# Patient Record
Sex: MALE | Race: WHITE | NOT HISPANIC OR LATINO | Employment: FULL TIME | URBAN - METROPOLITAN AREA
[De-identification: names, ages, dates, MRNs, and addresses within clinical notes are randomized per-mention and may not be internally consistent; named-entity substitution may affect disease eponyms.]

---

## 2022-01-12 ENCOUNTER — OFFICE VISIT (OUTPATIENT)
Dept: GASTROENTEROLOGY | Facility: CLINIC | Age: 57
End: 2022-01-12

## 2022-01-12 VITALS
SYSTOLIC BLOOD PRESSURE: 129 MMHG | WEIGHT: 173 LBS | OXYGEN SATURATION: 97 % | BODY MASS INDEX: 22.93 KG/M2 | DIASTOLIC BLOOD PRESSURE: 82 MMHG | HEIGHT: 73 IN | HEART RATE: 64 BPM | RESPIRATION RATE: 14 BRPM

## 2022-01-12 DIAGNOSIS — Z01.812 PRE-PROCEDURE LAB EXAM: Primary | ICD-10-CM

## 2022-01-12 DIAGNOSIS — K62.5 RECTAL BLEEDING: Primary | ICD-10-CM

## 2022-01-12 DIAGNOSIS — R10.9 ABDOMINAL PAIN, UNSPECIFIED ABDOMINAL LOCATION: ICD-10-CM

## 2022-01-12 PROCEDURE — 99999 PR PBB SHADOW E&M-NEW PATIENT-LVL IV: ICD-10-PCS | Mod: PBBFAC,,, | Performed by: INTERNAL MEDICINE

## 2022-01-12 PROCEDURE — 99204 OFFICE O/P NEW MOD 45 MIN: CPT | Mod: S$PBB,,, | Performed by: INTERNAL MEDICINE

## 2022-01-12 PROCEDURE — 99204 PR OFFICE/OUTPT VISIT, NEW, LEVL IV, 45-59 MIN: ICD-10-PCS | Mod: S$PBB,,, | Performed by: INTERNAL MEDICINE

## 2022-01-12 PROCEDURE — 99204 OFFICE O/P NEW MOD 45 MIN: CPT | Mod: PBBFAC,PN | Performed by: INTERNAL MEDICINE

## 2022-01-12 PROCEDURE — 99999 PR PBB SHADOW E&M-NEW PATIENT-LVL IV: CPT | Mod: PBBFAC,,, | Performed by: INTERNAL MEDICINE

## 2022-01-12 RX ORDER — LISINOPRIL 10 MG/1
10 TABLET ORAL DAILY
COMMUNITY

## 2022-01-12 NOTE — PATIENT INSTRUCTIONS
He will be scheduled for CT scan of the abdomen to evaluate the back pain and abdominal symptoms.  He will need to have a colonoscopy.  Labs will be obtained today.  He continues his current medications vitamins and minerals and adequate fiber to produced daily bowel movements.

## 2022-01-12 NOTE — PROGRESS NOTES
Subjective:       Patient ID: Lucas Dorsey is a 56 y.o. male.    Chief Complaint: Rectal Bleeding (PT reports having large amounts of blood in stool.Accompanying back pain) and Abdominal Pain (X 4 months )    He states approximately 3 months ago he developed lower back pain.  He did not associate the back pain with work injury or musculoskeletal spasm.  He thought he might have had a kidney stone although he denies  symptoms.  Several years ago he was told he had prostatism is been on the Flomax but he has not experienced dysuria or hematuria.  At approximately the same time he started having episodes of hematochezia.  The bowel movements were always associated with red blood.  Sometimes he passed red blood clots..  The episodes lasted for a few days and then they stopped.  He cannot pinpoint a precipitating factor.  The rectal bleeding did not appear to be associated with the back pain.  He works as a  and is physically active.  He has been physically active at all of his life.  He denies easy bleeding or hematemesis.  He states his bowel function today is normal.  He was adopted so he does not know his family history.      Allergies:  Review of patient's allergies indicates:   Allergen Reactions    Erythromycin Shortness Of Breath    Penicillins Shortness Of Breath       Medications:    Current Outpatient Medications:     lisinopriL 10 MG tablet, Take 10 mg by mouth once daily., Disp: , Rfl:     tamsulosin HCl (FLOMAX ORAL), Take by mouth. Unknown dosage, Disp: , Rfl:     History reviewed. No pertinent past medical history.    History reviewed. No pertinent surgical history.      Review of Systems   Constitutional: Negative for appetite change, fever and unexpected weight change.   HENT: Negative for trouble swallowing.         No jaundice.   Respiratory: Negative for cough, shortness of breath and wheezing.         He is a cigarette smoker.  He has been offered the smoking cessation  program in the past.  He denies alcohol usage.  He denies dysphagia aspiration hemoptysis chronic cough chronic sputum production or dyspnea on exertion.   Cardiovascular: Negative for chest pain.        He denies exertional chest pain or rhythm disturbance   Gastrointestinal: Positive for anal bleeding. Negative for abdominal distention, abdominal pain, blood in stool, constipation, diarrhea, nausea and rectal pain.        He was treated 2 years ago for chronic hepatitis C as was his wife.. They were immunized for hepatitis a and B. They were told they were SVR   Musculoskeletal: Positive for arthralgias and back pain. Negative for neck pain.        He has had surgery on left ankle   Skin: Negative for pallor and rash.   Neurological: Negative for dizziness, seizures, syncope, speech difficulty, weakness and numbness.   Hematological: Negative for adenopathy.   Psychiatric/Behavioral: Negative for confusion.       Objective:      Physical Exam  Vitals reviewed.   Constitutional:       Appearance: He is well-developed and well-nourished.      Comments: Well-nourished well-hydrated afebrile nonicteric white male.  He is normocephalic.  Pupils are normal.  He appears to be oriented x3 and can relate his history and answer questions appropriately.   HENT:      Head: Normocephalic.   Eyes:      Extraocular Movements: EOM normal.      Pupils: Pupils are equal, round, and reactive to light.   Neck:      Thyroid: No thyromegaly.      Trachea: No tracheal deviation.   Cardiovascular:      Rate and Rhythm: Normal rate and regular rhythm.      Heart sounds: Normal heart sounds.   Pulmonary:      Effort: Pulmonary effort is normal.      Breath sounds: Normal breath sounds.   Abdominal:      General: Bowel sounds are normal. There is no distension.      Palpations: Abdomen is soft. There is no mass.      Tenderness: There is no abdominal tenderness. There is no right CVA tenderness, left CVA tenderness, guarding or rebound.       Hernia: No hernia is present.      Comments: The abdomen is soft without tenderness masses organomegaly.  Bowel sounds are normal.   Musculoskeletal:         General: Normal range of motion.      Cervical back: Normal range of motion and neck supple.      Comments: Ambulate normally.  He can go from the sitting the standing position without difficulty.  He can get on the exam table without difficulty or assistance.   Lymphadenopathy:      Cervical: No cervical adenopathy.   Skin:     General: Skin is warm and dry.   Neurological:      Mental Status: He is alert and oriented to person, place, and time.      Cranial Nerves: No cranial nerve deficit.   Psychiatric:         Mood and Affect: Mood and affect normal.         Behavior: Behavior normal.           Plan:       Rectal bleeding    Abdominal pain, unspecified abdominal location  -     CT Abdomen Pelvis W Wo Contrast; Future; Expected date: 01/12/2022  -     CBC Auto Differential; Future; Expected date: 01/12/2022  -     Comprehensive Metabolic Panel; Future; Expected date: 01/12/2022    Initially CT scan and labs will be obtained.  The prior clinic records were requested.  He will need to have a colonoscopy.  He will make a food diary avoid the offending foods and add more fiber to the diet.  He continues his current medications vitamins and minerals.  He is encouraged to enroll in the smoking cessation program.

## 2022-01-18 ENCOUNTER — HOSPITAL ENCOUNTER (OUTPATIENT)
Dept: RADIOLOGY | Facility: HOSPITAL | Age: 57
Discharge: HOME | End: 2022-01-18
Attending: INTERNAL MEDICINE

## 2022-01-18 DIAGNOSIS — R10.9 ABDOMINAL PAIN, UNSPECIFIED ABDOMINAL LOCATION: ICD-10-CM

## 2022-01-18 PROCEDURE — 74178 CT ABD&PLV WO CNTR FLWD CNTR: CPT | Mod: TC

## 2022-01-18 PROCEDURE — 25500020 PHARM REV CODE 255: Performed by: INTERNAL MEDICINE

## 2022-01-18 PROCEDURE — A9698 NON-RAD CONTRAST MATERIALNOC: HCPCS | Performed by: INTERNAL MEDICINE

## 2022-01-18 PROCEDURE — 74178 CT ABD&PLV WO CNTR FLWD CNTR: CPT | Mod: 26,,, | Performed by: RADIOLOGY

## 2022-01-18 PROCEDURE — 74178 CT ABDOMEN PELVIS W WO CONTRAST: ICD-10-PCS | Mod: 26,,, | Performed by: RADIOLOGY

## 2022-01-18 RX ADMIN — IOHEXOL 1000 ML: 9 SOLUTION ORAL at 11:01

## 2022-01-18 RX ADMIN — IOHEXOL 75 ML: 350 INJECTION, SOLUTION INTRAVENOUS at 12:01

## 2022-01-19 ENCOUNTER — TELEPHONE (OUTPATIENT)
Dept: GASTROENTEROLOGY | Facility: CLINIC | Age: 57
End: 2022-01-19

## 2022-01-19 NOTE — TELEPHONE ENCOUNTER
----- Message from Dino Yang sent at 1/19/2022  3:15 PM CST -----  Contact: pt self [phone]  Type: Patient Call Back         Who called: Pt self          What is the request in detail:In regards to CT results          Can the clinic reply by [facility]?No         Would the patient rather a call back or a response via [facility]? Call back          Best call back number:[phone]         Additional Information:            Thank You

## 2022-01-19 NOTE — TELEPHONE ENCOUNTER
Spoke to patient regarding result. Informed him that it has not been reviewed by the doctor yet and that I will give him a call with the results as soon as I get them. Pt verbalized understanding and stated to contact him at listed number.

## 2022-01-20 ENCOUNTER — TELEPHONE (OUTPATIENT)
Dept: GASTROENTEROLOGY | Facility: CLINIC | Age: 57
End: 2022-01-20

## 2022-01-20 DIAGNOSIS — K62.5 RECTAL BLEEDING: Primary | ICD-10-CM

## 2022-01-20 DIAGNOSIS — R10.9 ABDOMINAL PAIN, UNSPECIFIED ABDOMINAL LOCATION: ICD-10-CM

## 2022-01-20 DIAGNOSIS — Z01.818 PRE-OP TESTING: ICD-10-CM

## 2022-01-20 DIAGNOSIS — N40.0 ENLARGED PROSTATE: Primary | ICD-10-CM

## 2022-01-20 NOTE — TELEPHONE ENCOUNTER
Spoke with patient with results. He stated he would like to proceed with Flushing for the bleeding and get a referral to urology for the prostate. Pt coming in 1/24/22 @ 8:30 to review prep instructions and schedule for upcoming tests. Pt verbalized understanding.

## 2022-01-24 ENCOUNTER — CLINICAL SUPPORT (OUTPATIENT)
Dept: GASTROENTEROLOGY | Facility: CLINIC | Age: 57
End: 2022-01-24

## 2022-01-24 ENCOUNTER — TELEPHONE (OUTPATIENT)
Dept: GASTROENTEROLOGY | Facility: CLINIC | Age: 57
End: 2022-01-24

## 2022-01-24 DIAGNOSIS — K62.5 RECTAL BLEEDING: ICD-10-CM

## 2022-01-24 DIAGNOSIS — Z01.818 PREOP TESTING: Primary | ICD-10-CM

## 2022-01-24 RX ORDER — SODIUM, POTASSIUM,MAG SULFATES 17.5-3.13G
1 SOLUTION, RECONSTITUTED, ORAL ORAL DAILY
Qty: 1 KIT | Refills: 0 | Status: SHIPPED | OUTPATIENT
Start: 2022-01-24 | End: 2022-01-26

## 2022-01-24 NOTE — TELEPHONE ENCOUNTER
----- Message from Maria Fernanda Thayer sent at 1/24/2022 12:06 PM CST -----  Type:  Pharmacy Calling to Clarify an RX    Name of Caller:  [facility]  Pharmacy Name:  [facility]   Prescription Name:  Prep kit for procedure  What do they need to clarify?:  Asking if there is a different type that the patient can take. The medication is 100 dollars out of pocket  Best Call Back Number:  [phone]  Additional Information:  Please call and advise

## 2022-01-24 NOTE — PROGRESS NOTES
Spoke with patient and informed patient on preop intsructions.  Patient verbalized understanding and has been scheduled for preop testing.  No further questions or concerns at this time.

## 2022-01-26 ENCOUNTER — TELEPHONE (OUTPATIENT)
Dept: GASTROENTEROLOGY | Facility: CLINIC | Age: 57
End: 2022-01-26

## 2022-01-26 NOTE — TELEPHONE ENCOUNTER
----- Message from Mariah Singh sent at 1/25/2022  2:43 PM CST -----  Contact: Patient    ----- Message -----  From: Clarisse Cortez  Sent: 1/24/2022   4:30 PM CST  To: Vladislav Keller Staff    Type:  Patient Returning Call    Who Called:  Patient  Who Left Message for Patient:  Ashlyn  Does the patient know what this is regarding?:  Yes  Best Call Back Number:  [phone]  Additional Information:  Please call back.  Thanks.

## 2022-01-30 ENCOUNTER — LAB VISIT (OUTPATIENT)
Dept: FAMILY MEDICINE | Facility: CLINIC | Age: 57
End: 2022-01-30
Payer: OTHER GOVERNMENT

## 2022-01-30 ENCOUNTER — HOSPITAL ENCOUNTER (OUTPATIENT)
Dept: CARDIOLOGY | Facility: HOSPITAL | Age: 57
Discharge: HOME | End: 2022-01-30
Attending: INTERNAL MEDICINE
Payer: OTHER GOVERNMENT

## 2022-01-30 DIAGNOSIS — Z01.818 PREOP TESTING: ICD-10-CM

## 2022-01-30 DIAGNOSIS — Z01.818 PRE-OP TESTING: ICD-10-CM

## 2022-01-30 PROCEDURE — U0005 INFEC AGEN DETEC AMPLI PROBE: HCPCS | Performed by: INTERNAL MEDICINE

## 2022-01-30 PROCEDURE — 93010 ELECTROCARDIOGRAM REPORT: CPT | Mod: ,,, | Performed by: INTERNAL MEDICINE

## 2022-01-30 PROCEDURE — 93005 ELECTROCARDIOGRAM TRACING: CPT

## 2022-01-30 PROCEDURE — 93010 EKG 12-LEAD: ICD-10-PCS | Mod: ,,, | Performed by: INTERNAL MEDICINE

## 2022-01-30 PROCEDURE — U0003 INFECTIOUS AGENT DETECTION BY NUCLEIC ACID (DNA OR RNA); SEVERE ACUTE RESPIRATORY SYNDROME CORONAVIRUS 2 (SARS-COV-2) (CORONAVIRUS DISEASE [COVID-19]), AMPLIFIED PROBE TECHNIQUE, MAKING USE OF HIGH THROUGHPUT TECHNOLOGIES AS DESCRIBED BY CMS-2020-01-R: HCPCS | Performed by: INTERNAL MEDICINE

## 2022-01-31 LAB
SARS-COV-2 RNA RESP QL NAA+PROBE: DETECTED
SARS-COV-2- CYCLE NUMBER: 33

## 2022-02-02 DIAGNOSIS — K62.5 RECTAL BLEEDING: Primary | ICD-10-CM

## 2022-02-02 RX ORDER — SODIUM, POTASSIUM,MAG SULFATES 17.5-3.13G
1 SOLUTION, RECONSTITUTED, ORAL ORAL DAILY
Qty: 1 KIT | Refills: 0 | Status: SHIPPED | OUTPATIENT
Start: 2022-02-02 | End: 2022-02-04

## 2022-02-02 RX ORDER — SODIUM CHLORIDE 0.9 % (FLUSH) 0.9 %
10 SYRINGE (ML) INJECTION
Status: CANCELLED | OUTPATIENT
Start: 2022-02-02

## 2022-02-02 NOTE — TELEPHONE ENCOUNTER
----- Message from [employer] sent at 2/2/2022  8:12 AM CST -----  Contact: pt  Type: Needs Medical Advice  Who Called: pt    Best Call Back Number: [phone]    Additional Information: pt states he needs to reschedule his colonoscopy and get pre surgery medicine.  Please call to setup

## 2022-02-02 NOTE — TELEPHONE ENCOUNTER
Call placed to patient Colonoscopy rescheduled due to positive covid status.  Patient has no further questions or concerns at this time.

## 2022-02-14 ENCOUNTER — TELEPHONE (OUTPATIENT)
Dept: GASTROENTEROLOGY | Facility: CLINIC | Age: 57
End: 2022-02-14
Payer: OTHER GOVERNMENT

## 2022-02-14 DIAGNOSIS — K62.5 RECTAL BLEEDING: Primary | ICD-10-CM

## 2022-02-14 RX ORDER — SODIUM, POTASSIUM,MAG SULFATES 17.5-3.13G
1 SOLUTION, RECONSTITUTED, ORAL ORAL DAILY
Qty: 1 KIT | Refills: 0 | Status: CANCELLED | OUTPATIENT
Start: 2022-02-14 | End: 2022-02-16

## 2022-02-14 RX ORDER — SOD SULF/POT CHLORIDE/MAG SULF 1.479 G
TABLET ORAL
Qty: 24 TABLET | Refills: 0 | Status: SHIPPED | OUTPATIENT
Start: 2022-02-14

## 2022-02-14 NOTE — TELEPHONE ENCOUNTER
Call placed to patient and patient would like procedure rescheduled to 2/22/22.  Surgery department faxed regarding rescheduling.

## 2022-02-14 NOTE — TELEPHONE ENCOUNTER
----- Message from Quin Murillo sent at 2/14/2022  2:42 PM CST -----  Type:  Patient Call Back    Who Called: Lucas     What is the reqeust in detail: Pt is requesting a call in regards to scheduling his colonoscopy. Please Advise     Can the clinic reply by [facility]?    Best Call Back Number: [phone]

## 2022-02-14 NOTE — TELEPHONE ENCOUNTER
----- Message from Pola Dimas sent at 2/14/2022  7:57 AM CST -----  Type: Needs Medical Advice  Who Called:  Patient    Best Call Back Number: [phone]  Additional Information: Patient states that he would like a callback regarding rescheduling his procedure.

## 2022-02-14 NOTE — TELEPHONE ENCOUNTER
----- Message from Tiffanie Blancas sent at 2/14/2022  9:14 AM CST -----  Contact: pt  Type: Needs Medical Advice    Who Called: pt   Best Call Back Number: [phone]    Inquiry/Question: pt needs to cancel procedure and would like to speak to you about it. He went Saturday to get his covid test and they told him he wasn't on the schedule for it.        Thank you~

## 2022-02-15 RX ORDER — SOD SULF/POT CHLORIDE/MAG SULF 1.479 G
12 TABLET ORAL DAILY
Qty: 24 TABLET | Refills: 0 | Status: SHIPPED | OUTPATIENT
Start: 2022-02-15

## 2022-02-16 ENCOUNTER — HOSPITAL ENCOUNTER (EMERGENCY)
Facility: HOSPITAL | Age: 57
Discharge: HOME | End: 2022-02-16
Attending: FAMILY MEDICINE

## 2022-02-16 VITALS
DIASTOLIC BLOOD PRESSURE: 81 MMHG | HEART RATE: 57 BPM | WEIGHT: 170 LBS | HEIGHT: 72 IN | TEMPERATURE: 98 F | SYSTOLIC BLOOD PRESSURE: 113 MMHG | OXYGEN SATURATION: 98 % | RESPIRATION RATE: 9 BRPM | BODY MASS INDEX: 23.03 KG/M2

## 2022-02-16 DIAGNOSIS — R06.02 SOB (SHORTNESS OF BREATH): ICD-10-CM

## 2022-02-16 LAB
ALBUMIN SERPL BCP-MCNC: 4 G/DL (ref 3.5–5.2)
ALP SERPL-CCNC: 52 U/L (ref 55–135)
ALT SERPL W/O P-5'-P-CCNC: 11 U/L (ref 10–44)
ANION GAP SERPL CALC-SCNC: 11 MMOL/L (ref 8–16)
AST SERPL-CCNC: 13 U/L (ref 10–40)
BASOPHILS # BLD AUTO: 0.14 K/UL (ref 0–0.2)
BASOPHILS NFR BLD: 1.5 % (ref 0–1.9)
BILIRUB SERPL-MCNC: 0.9 MG/DL (ref 0.1–1)
BUN SERPL-MCNC: 16 MG/DL (ref 6–20)
CALCIUM SERPL-MCNC: 10.1 MG/DL (ref 8.7–10.5)
CHLORIDE SERPL-SCNC: 104 MMOL/L (ref 95–110)
CO2 SERPL-SCNC: 26 MMOL/L (ref 23–29)
CREAT SERPL-MCNC: 1.2 MG/DL (ref 0.5–1.4)
DIFFERENTIAL METHOD: ABNORMAL
EOSINOPHIL # BLD AUTO: 0.4 K/UL (ref 0–0.5)
EOSINOPHIL NFR BLD: 4.8 % (ref 0–8)
ERYTHROCYTE [DISTWIDTH] IN BLOOD BY AUTOMATED COUNT: 13.2 % (ref 11.5–14.5)
EST. GFR  (AFRICAN AMERICAN): >60 ML/MIN/1.73 M^2
EST. GFR  (NON AFRICAN AMERICAN): >60 ML/MIN/1.73 M^2
GLUCOSE SERPL-MCNC: 103 MG/DL (ref 70–110)
HCT VFR BLD AUTO: 45.8 % (ref 40–54)
HGB BLD-MCNC: 15.8 G/DL (ref 14–18)
IMM GRANULOCYTES # BLD AUTO: 0.04 K/UL (ref 0–0.04)
IMM GRANULOCYTES NFR BLD AUTO: 0.4 % (ref 0–0.5)
LYMPHOCYTES # BLD AUTO: 4.5 K/UL (ref 1–4.8)
LYMPHOCYTES NFR BLD: 48.6 % (ref 18–48)
MCH RBC QN AUTO: 31.7 PG (ref 27–31)
MCHC RBC AUTO-ENTMCNC: 34.5 G/DL (ref 32–36)
MCV RBC AUTO: 92 FL (ref 82–98)
MONOCYTES # BLD AUTO: 0.7 K/UL (ref 0.3–1)
MONOCYTES NFR BLD: 8 % (ref 4–15)
NEUTROPHILS # BLD AUTO: 3.4 K/UL (ref 1.8–7.7)
NEUTROPHILS NFR BLD: 36.7 % (ref 38–73)
NRBC BLD-RTO: 0 /100 WBC
PLATELET # BLD AUTO: 264 K/UL (ref 150–450)
PMV BLD AUTO: 9.6 FL (ref 9.2–12.9)
POTASSIUM SERPL-SCNC: 4.6 MMOL/L (ref 3.5–5.1)
PROT SERPL-MCNC: 7.3 G/DL (ref 6–8.4)
RBC # BLD AUTO: 4.98 M/UL (ref 4.6–6.2)
SODIUM SERPL-SCNC: 141 MMOL/L (ref 136–145)
WBC # BLD AUTO: 9.2 K/UL (ref 3.9–12.7)

## 2022-02-16 PROCEDURE — 93010 ELECTROCARDIOGRAM REPORT: CPT | Mod: ,,, | Performed by: INTERNAL MEDICINE

## 2022-02-16 PROCEDURE — 85025 COMPLETE CBC W/AUTO DIFF WBC: CPT | Performed by: EMERGENCY MEDICINE

## 2022-02-16 PROCEDURE — 71046 X-RAY EXAM CHEST 2 VIEWS: CPT | Mod: 26,,, | Performed by: RADIOLOGY

## 2022-02-16 PROCEDURE — 93010 EKG 12-LEAD: ICD-10-PCS | Mod: ,,, | Performed by: INTERNAL MEDICINE

## 2022-02-16 PROCEDURE — 99285 EMERGENCY DEPT VISIT HI MDM: CPT | Mod: 25

## 2022-02-16 PROCEDURE — 71046 XR CHEST PA AND LATERAL: ICD-10-PCS | Mod: 26,,, | Performed by: RADIOLOGY

## 2022-02-16 PROCEDURE — 94761 N-INVAS EAR/PLS OXIMETRY MLT: CPT

## 2022-02-16 PROCEDURE — 93005 ELECTROCARDIOGRAM TRACING: CPT

## 2022-02-16 PROCEDURE — 71046 X-RAY EXAM CHEST 2 VIEWS: CPT | Mod: TC,FY

## 2022-02-16 PROCEDURE — 80053 COMPREHEN METABOLIC PANEL: CPT | Performed by: EMERGENCY MEDICINE

## 2022-02-16 RX ORDER — BENZONATATE 100 MG/1
100 CAPSULE ORAL 3 TIMES DAILY PRN
Qty: 20 CAPSULE | Refills: 0 | Status: SHIPPED | OUTPATIENT
Start: 2022-02-16 | End: 2022-02-26

## 2022-02-16 RX ORDER — ALBUTEROL SULFATE 90 UG/1
1-2 AEROSOL, METERED RESPIRATORY (INHALATION) EVERY 6 HOURS PRN
Qty: 1 G | Refills: 3 | Status: SHIPPED | OUTPATIENT
Start: 2022-02-16

## 2022-02-16 RX ORDER — LEVOFLOXACIN 750 MG/1
750 TABLET ORAL DAILY
Qty: 7 TABLET | Refills: 0 | Status: SHIPPED | OUTPATIENT
Start: 2022-02-16

## 2022-02-16 RX ORDER — PREDNISONE 20 MG/1
20 TABLET ORAL DAILY
Qty: 15 TABLET | Refills: 1 | Status: SHIPPED | OUTPATIENT
Start: 2022-02-16

## 2022-02-16 RX ORDER — PROMETHAZINE HYDROCHLORIDE AND CODEINE PHOSPHATE 6.25; 1 MG/5ML; MG/5ML
10 SOLUTION ORAL EVERY 4 HOURS PRN
Qty: 120 ML | Refills: 0 | Status: SHIPPED | OUTPATIENT
Start: 2022-02-16 | End: 2022-02-26

## 2022-02-16 NOTE — ED PROVIDER NOTES
Encounter Date: 2/16/2022       History     Chief Complaint   Patient presents with    Post covid symptoms     Pt reports testing positive for covid on 02/01/22. Pt reports developing right flank pain radiating up towards his shoulder blade/neck. Pt reports associated exertional dyspnea. Pt reports symptoms x 5 days.      Well-developed 56-year-old male comes emergency with complaints of shortness of breath.  The patient was diagnosed with COVID approximately 3 weeks ago.  The patient states that he did fairly well for the last 2 and half weeks.  Over the last 3-4 days developed worsening cough.  Some wheezing noted.  The patient is a smoker.  Pack and half a day.  20+ year pack history.  Past medical history of hypertension.  Otherwise unremarkable.  Patient states that the shortness of breath gets worse occasionally with walking.  Also after smoking denies any chest pain.  Possibly some fever over the last 2-3 days.  Stable otherwise  also complains of pain of the right rhomboid with any type of cough for working of the right arm.  This occurred after the cough developed over the last 3 days.        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   HENT: Positive for postnasal drip.    Respiratory: Positive for cough. Negative for shortness of breath and wheezing.    Cardiovascular: Negative for chest pain.   Gastrointestinal:  Negative.    Musculoskeletal: Positive for arthralgias (Pain in the patient's right side of his back secondary to coughing.  100 percent reproducible.).   All other systems reviewed and are negative.      Physical Exam     Initial Vitals [02/16/22 1226]   BP Pulse Resp Temp SpO2   103/73 92 20 97.9 °F (36.6 °C) 98 %      MAP       --         Physical Exam    Nursing note and vitals reviewed.  Constitutional: He appears well-developed and well-nourished.   Tall thin slender build   HENT:   Head: Normocephalic and atraumatic.   Eyes: Pupils are equal, round, and reactive to light.   Neck: Neck supple.   Normal range of motion.  Cardiovascular: Normal rate, regular rhythm, normal heart sounds and intact distal pulses.   No murmur heard.  Pulmonary/Chest: No respiratory distress. He has wheezes. He has rales (Mild rales and wheezes right base.).   Abdominal: Abdomen is soft. Bowel sounds are normal. He exhibits no distension and no mass. There is no abdominal tenderness. There is no rebound and no guarding.   Musculoskeletal:         General: Normal range of motion.      Cervical back: Normal range of motion and neck supple.      Comments: Tender to palpation right rhomboid.  Palpation reproduces pain 100 percent.     Neurological: He is alert and oriented to person, place, and time. He has normal strength.   Skin: Skin is warm. Capillary refill takes less than 2 seconds.   Psychiatric: He has a normal mood and affect. Thought content normal.         ED Course   Procedures  Labs Reviewed   COMPREHENSIVE METABOLIC PANEL - Abnormal; Notable for the following components:       Result Value    Alkaline Phosphatase 52 (*)     All other components within normal limits   CBC W/ AUTO DIFFERENTIAL - Abnormal; Notable for the following components:    MCH 31.7 (*)     Gran % 36.7 (*)     Lymph % 48.6 (*)     All other components within normal limits     EKG Readings: (Independently Interpreted)   Rate 72, normal sinus rhythm,  normal axis, normal QRS, normal R-wave progression, normal EKG.       Imaging Results          X-ray Chest PA And Lateral (Final result)  Result time 02/16/22 13:29:23    Final result by Macie Everett MD (02/16/22 13:29:23)                 Impression:      No acute cardiopulmonary disease.      Electronically signed by: Macie Everett  Date:    02/16/2022  Time:    13:29             Narrative:    EXAMINATION:  XR CHEST PA AND LATERAL    CLINICAL HISTORY:  Shortness of breath;    TECHNIQUE:  PA and lateral views of the chest were performed.    COMPARISON:  07/08/2017    FINDINGS:  Cardiomediastinal silhouette within normal limits.  There are small calcified granulomata in both lungs unchanged.  There is mild biapical pleural thickening.  Lungs are otherwise clear.  There is no pleural effusion, pneumothorax or acute bony abnormality.                                 Medications - No data to display  Medical Decision Making:   Differential Diagnosis:   Pneumonia, upper for a viral infection, foreign body, pharyngitis, COPD, ALLERGIES, postnasal drip, drug induced, and aerosolized inhalants, airborne irritants.  ED Management:  The patient stable this time.  He has post COVID cough.  I will treat the patient with antibiotics, steroids, Tessalon Perles, albuterol HFA.                      Clinical Impression:   Final diagnoses:  [R06.02] SOB (shortness of breath)          ED Disposition Condition    Discharge Stable        ED Prescriptions     Medication Sig Dispense Start Date End Date Auth. Provider    albuterol (PROVENTIL/VENTOLIN HFA) 90 mcg/actuation inhaler Inhale 1-2 puffs into the lungs every 6 (six) hours as needed for Wheezing. Rescue 1 g 2/16/2022  Oskar Perkins MD    promethazine-codeine 6.25-10 mg/5 ml (PHENERGAN WITH CODEINE) 6.25-10 mg/5 mL syrup Take 10 mLs by mouth every 4 (four) hours as needed for Cough. 120 mL 2/16/2022 2/26/2022 Oskar Perkins MD    benzonatate (TESSALON) 100 MG  capsule Take 1 capsule (100 mg total) by mouth 3 (three) times daily as needed. 20 capsule 2/16/2022 2/26/2022 Oskar Perkins MD    levoFLOXacin (LEVAQUIN) 750 MG tablet Take 1 tablet (750 mg total) by mouth once daily. 7 tablet 2/16/2022  Oskar Perkins MD    predniSONE (DELTASONE) 20 MG tablet Take 1 tablet (20 mg total) by mouth once daily. On days number 1 and 2 take 3 at 1 time in the morning.  On days 3., 4, 5 take 2 at 1 time in the morning, on days 6., 7, 8 take 1 in the morning. 15 tablet 2/16/2022  Oskar Perkins MD        Follow-up Information    None          Oskar Perkins MD  02/16/22 1501       Oskar Perkins MD  02/17/22 3776

## 2022-02-16 NOTE — DISCHARGE INSTRUCTIONS
Follow-up with primary care physician as necessary, return emergency with any worsening shortness of breath, fever, chills, chest pain or any other concerning symptoms.  Take medications as prescribed.  Stop smoking.   Patient will not be seen by this liaison today due to him being on precedex and not oriented x4. Will check on him tomorrow.

## 2022-02-16 NOTE — ED NOTES
Pt states that for the last 5 days he has been having increased sob with exacerbation. Pt states that when he walks and becomes sob he also becomes dizzy at times pt states that he has been having pain in his right shoulder that radiates down his back area.

## 2022-02-22 ENCOUNTER — ANESTHESIA (OUTPATIENT)
Dept: SURGERY | Facility: HOSPITAL | Age: 57
End: 2022-02-22

## 2022-02-22 ENCOUNTER — HOSPITAL ENCOUNTER (OUTPATIENT)
Facility: HOSPITAL | Age: 57
Discharge: HOME | End: 2022-02-22
Attending: INTERNAL MEDICINE | Admitting: INTERNAL MEDICINE
Payer: OTHER GOVERNMENT

## 2022-02-22 ENCOUNTER — ANESTHESIA EVENT (OUTPATIENT)
Dept: SURGERY | Facility: HOSPITAL | Age: 57
End: 2022-02-22

## 2022-02-22 VITALS
WEIGHT: 175 LBS | HEART RATE: 61 BPM | HEIGHT: 72 IN | BODY MASS INDEX: 23.7 KG/M2 | TEMPERATURE: 98 F | DIASTOLIC BLOOD PRESSURE: 91 MMHG | SYSTOLIC BLOOD PRESSURE: 143 MMHG | OXYGEN SATURATION: 98 % | RESPIRATION RATE: 16 BRPM

## 2022-02-22 DIAGNOSIS — K62.5 RECTAL BLEEDING: ICD-10-CM

## 2022-02-22 PROCEDURE — 37000009 HC ANESTHESIA EA ADD 15 MINS: Performed by: INTERNAL MEDICINE

## 2022-02-22 PROCEDURE — 45378 PR COLONOSCOPY,DIAGNOSTIC: ICD-10-PCS | Mod: ,,, | Performed by: INTERNAL MEDICINE

## 2022-02-22 PROCEDURE — 37000008 HC ANESTHESIA 1ST 15 MINUTES: Performed by: INTERNAL MEDICINE

## 2022-02-22 PROCEDURE — D9220A PRA ANESTHESIA: Mod: ,,, | Performed by: ANESTHESIOLOGY

## 2022-02-22 PROCEDURE — 25000003 PHARM REV CODE 250: Performed by: ANESTHESIOLOGY

## 2022-02-22 PROCEDURE — D9220A PRA ANESTHESIA: ICD-10-PCS | Mod: ,,, | Performed by: ANESTHESIOLOGY

## 2022-02-22 PROCEDURE — 45378 DIAGNOSTIC COLONOSCOPY: CPT | Mod: ,,, | Performed by: INTERNAL MEDICINE

## 2022-02-22 PROCEDURE — 63600175 PHARM REV CODE 636 W HCPCS: Performed by: NURSE ANESTHETIST, CERTIFIED REGISTERED

## 2022-02-22 PROCEDURE — 63600175 PHARM REV CODE 636 W HCPCS: Performed by: ANESTHESIOLOGY

## 2022-02-22 PROCEDURE — 45378 DIAGNOSTIC COLONOSCOPY: CPT | Performed by: INTERNAL MEDICINE

## 2022-02-22 PROCEDURE — 25000003 PHARM REV CODE 250: Performed by: NURSE ANESTHETIST, CERTIFIED REGISTERED

## 2022-02-22 RX ORDER — ONDANSETRON 2 MG/ML
4 INJECTION INTRAMUSCULAR; INTRAVENOUS DAILY PRN
Status: DISCONTINUED | OUTPATIENT
Start: 2022-02-22 | End: 2022-02-22 | Stop reason: HOSPADM

## 2022-02-22 RX ORDER — FAMOTIDINE 10 MG/ML
INJECTION INTRAVENOUS
Status: DISCONTINUED
Start: 2022-02-22 | End: 2022-02-22 | Stop reason: HOSPADM

## 2022-02-22 RX ORDER — SODIUM CHLORIDE, SODIUM LACTATE, POTASSIUM CHLORIDE, CALCIUM CHLORIDE 600; 310; 30; 20 MG/100ML; MG/100ML; MG/100ML; MG/100ML
INJECTION, SOLUTION INTRAVENOUS CONTINUOUS
Status: DISCONTINUED | OUTPATIENT
Start: 2022-02-22 | End: 2022-02-22 | Stop reason: HOSPADM

## 2022-02-22 RX ORDER — LIDOCAINE HYDROCHLORIDE 20 MG/ML
INJECTION, SOLUTION EPIDURAL; INFILTRATION; INTRACAUDAL; PERINEURAL
Status: DISCONTINUED | OUTPATIENT
Start: 2022-02-22 | End: 2022-02-22

## 2022-02-22 RX ORDER — FAMOTIDINE 10 MG/ML
20 INJECTION INTRAVENOUS ONCE
Status: DISCONTINUED | OUTPATIENT
Start: 2022-02-22 | End: 2022-02-22 | Stop reason: HOSPADM

## 2022-02-22 RX ORDER — PROPOFOL 10 MG/ML
VIAL (ML) INTRAVENOUS
Status: DISCONTINUED | OUTPATIENT
Start: 2022-02-22 | End: 2022-02-22

## 2022-02-22 RX ORDER — FAMOTIDINE 10 MG/ML
20 INJECTION INTRAVENOUS 2 TIMES DAILY
Status: DISCONTINUED | OUTPATIENT
Start: 2022-02-22 | End: 2022-02-22 | Stop reason: HOSPADM

## 2022-02-22 RX ORDER — LIDOCAINE HYDROCHLORIDE 10 MG/ML
1 INJECTION, SOLUTION EPIDURAL; INFILTRATION; INTRACAUDAL; PERINEURAL ONCE
Status: DISCONTINUED | OUTPATIENT
Start: 2022-02-22 | End: 2022-02-22 | Stop reason: HOSPADM

## 2022-02-22 RX ORDER — SODIUM CHLORIDE, SODIUM LACTATE, POTASSIUM CHLORIDE, CALCIUM CHLORIDE 600; 310; 30; 20 MG/100ML; MG/100ML; MG/100ML; MG/100ML
125 INJECTION, SOLUTION INTRAVENOUS CONTINUOUS
Status: DISCONTINUED | OUTPATIENT
Start: 2022-02-22 | End: 2022-02-22 | Stop reason: HOSPADM

## 2022-02-22 RX ORDER — SODIUM CHLORIDE 0.9 % (FLUSH) 0.9 %
10 SYRINGE (ML) INJECTION
Status: DISCONTINUED | OUTPATIENT
Start: 2022-02-22 | End: 2022-02-22 | Stop reason: HOSPADM

## 2022-02-22 RX ORDER — DIPHENHYDRAMINE HYDROCHLORIDE 50 MG/ML
12.5 INJECTION INTRAMUSCULAR; INTRAVENOUS
Status: DISCONTINUED | OUTPATIENT
Start: 2022-02-22 | End: 2022-02-22 | Stop reason: HOSPADM

## 2022-02-22 RX ADMIN — SODIUM CHLORIDE, POTASSIUM CHLORIDE, SODIUM LACTATE AND CALCIUM CHLORIDE: 600; 310; 30; 20 INJECTION, SOLUTION INTRAVENOUS at 12:02

## 2022-02-22 RX ADMIN — PROPOFOL 50 MG: 10 INJECTION, EMULSION INTRAVENOUS at 12:02

## 2022-02-22 RX ADMIN — LIDOCAINE HYDROCHLORIDE 100 MG: 20 INJECTION, SOLUTION EPIDURAL; INFILTRATION; INTRACAUDAL; PERINEURAL at 12:02

## 2022-02-22 RX ADMIN — PROPOFOL 50 MG: 10 INJECTION, EMULSION INTRAVENOUS at 01:02

## 2022-02-22 RX ADMIN — FAMOTIDINE 20 MG: 10 INJECTION INTRAVENOUS at 12:02

## 2022-02-22 NOTE — TRANSFER OF CARE
Anesthesia Transfer of Care Note    Patient: Lucas Dorsey    Procedure(s) Performed: Procedure(s) (LRB):  COLONOSCOPY (N/A)    Patient location: PACU    Anesthesia Type: general    Transport from OR: Transported from OR on room air with adequate spontaneous ventilation    Post pain: adequate analgesia    Post assessment: no apparent anesthetic complications and tolerated procedure well    Post vital signs: stable    Level of consciousness: responds to stimulation and sedated    Nausea/Vomiting: no nausea/vomiting    Complications: none    Transfer of care protocol was followed      Last vitals:   Visit Vitals  /81 (BP Location: Left arm, Patient Position: Lying)   Pulse (!) 51   Temp 36.7 °C (98 °F) (Oral)   Resp 16   Ht 6' (1.829 m)   Wt 79.4 kg (175 lb)   SpO2 99%   BMI 23.73 kg/m²

## 2022-02-22 NOTE — [employer] PATIENT INSTRUCTIONS
Discharge Summary/Instructions after an Endoscopic Procedure  Patient Name: Lucas Dorsey  Patient MRN: [id number]  Patient YOB: 1965 Tuesday, February 22, 2022  Krishna Loomis MD  RESTRICTIONS:  During your procedure today, you received medications for sedation.  These   medications may affect your judgment, balance and coordination.  Therefore,   for 24 hours, you have the following restrictions:   - DO NOT drive a car, operate machinery, make legal/financial decisions,   sign important papers or drink alcohol.    ACTIVITY:  Today: no heavy lifting, straining or running due to procedural   sedation/anesthesia.  The following day: return to full activity including work.  DIET:  Eat and drink normally unless instructed otherwise.     TREATMENT FOR COMMON SIDE EFFECTS:  - Mild abdominal pain, nausea, belching, bloating or excessive gas:  rest,   eat lightly and use a heating pad.  - Sore Throat: treat with throat lozenges and/or gargle with warm salt   water.  - Because air was used during the procedure, expelling large amounts of air   from your rectum or belching is normal.  - If a bowel prep was taken, you may not have a bowel movement for 1-3 days.    This is normal.  SYMPTOMS TO WATCH FOR AND REPORT TO YOUR PHYSICIAN:  1. Abdominal pain or bloating, other than gas cramps.  2. Chest pain.  3. Back pain.  4. Signs of infection such as: chills or fever occurring within 24 hours   after the procedure.  5. Rectal bleeding, which would show as bright red, maroon, or black stools.   (A tablespoon of blood from the rectum is not serious, especially if   hemorrhoids are present.)  6. Vomiting.  7. Weakness or dizziness.  GO DIRECTLY TO THE NEAREST EMERGENCY ROOM IF YOU HAVE ANY OF THE FOLLOWING:      Difficulty breathing              Chills and/or fever over 101 F   Persistent vomiting and/or vomiting blood   Severe abdominal pain   Severe chest pain   Black, tarry stools   Bleeding- more than one  tablespoon   Any other symptom or condition that you feel may need urgent attention  Your doctor recommends these additional instructions:  If any biopsies were taken, your doctors clinic will contact you in 1 to 2   weeks with any results.  - Discharge patient to home (ambulatory).   - Resume previous diet.  For questions, problems or results please call your physician - Krishna Loomis MD at Work:  [phone].  [facility] EMERGENCY ROOM PHONE NUMBER: [phone]  IF A COMPLICATION OR EMERGENCY SITUATION ARISES AND YOU ARE UNABLE TO REACH   YOUR PHYSICIAN - GO DIRECTLY TO THE EMERGENCY ROOM.  MD Krishna Moran MD  2/22/2022 1:23:10 PM  This report has been verified and signed electronically.  Dear patient,  As a result of recent federal legislation (The Federal Cures Act), you may   receive lab or pathology results from your procedure in your [facility]   account before your physician is able to contact you. Your physician or   their representative will relay the results to you with their   recommendations at their soonest availability.  Thank you,  [employer]

## 2022-02-22 NOTE — H&P
Office Visit    1/12/2022  [place] [facility]     Krishna Loomis MD      Gastroenterology  Rectal bleeding +1 more      Dx  Rectal Bleeding   Abdominal Pain ; Referred by Adria Andrea NP-C      Reason for Visit       Progress Notes  Krishna Loomis MD (Physician)   Gastroenterology     Subjective:       Patient ID: Lucas Dorsey is a 56 y.o. male.     Chief Complaint: Rectal Bleeding (PT reports having large amounts of blood in stool.Accompanying back pain) and Abdominal Pain (X 4 months )     He states approximately 3 months ago he developed lower back pain.  He did not associate the back pain with work injury or musculoskeletal spasm.  He thought he might have had a kidney stone although he denies  symptoms.  Several years ago he was told he had prostatism is been on the Flomax but he has not experienced dysuria or hematuria.  At approximately the same time he started having episodes of hematochezia.  The bowel movements were always associated with red blood.  Sometimes he passed red blood clots..  The episodes lasted for a few days and then they stopped.  He cannot pinpoint a precipitating factor.  The rectal bleeding did not appear to be associated with the back pain.  He works as a  and is physically active.  He has been physically active at all of his life.  He denies easy bleeding or hematemesis.  He states his bowel function today is normal.  He was adopted so he does not know his family history.        Allergies:       Review of patient's allergies indicates:   Allergen Reactions    Erythromycin Shortness Of Breath    Penicillins Shortness Of Breath         Medications:     Current Outpatient Medications:     lisinopriL 10 MG tablet, Take 10 mg by mouth once daily., Disp: , Rfl:     tamsulosin HCl (FLOMAX ORAL), Take by mouth. Unknown dosage, Disp: , Rfl:      History reviewed. No pertinent past medical history.     History reviewed. No pertinent surgical  history.        Review of Systems   Constitutional: Negative for appetite change, fever and unexpected weight change.   HENT: Negative for trouble swallowing.         No jaundice.   Respiratory: Negative for cough, shortness of breath and wheezing.         He is a cigarette smoker.  He has been offered the smoking cessation program in the past.  He denies alcohol usage.  He denies dysphagia aspiration hemoptysis chronic cough chronic sputum production or dyspnea on exertion.   Cardiovascular: Negative for chest pain.        He denies exertional chest pain or rhythm disturbance   Gastrointestinal: Positive for anal bleeding. Negative for abdominal distention, abdominal pain, blood in stool, constipation, diarrhea, nausea and rectal pain.        He was treated 2 years ago for chronic hepatitis C as was his wife.. They were immunized for hepatitis a and B. They were told they were SVR   Musculoskeletal: Positive for arthralgias and back pain. Negative for neck pain.        He has had surgery on left ankle   Skin: Negative for pallor and rash.   Neurological: Negative for dizziness, seizures, syncope, speech difficulty, weakness and numbness.   Hematological: Negative for adenopathy.   Psychiatric/Behavioral: Negative for confusion.       Objective:   Physical Exam  Vitals reviewed.   Constitutional:       Appearance: He is well-developed and well-nourished.      Comments: Well-nourished well-hydrated afebrile nonicteric white male.  He is normocephalic.  Pupils are normal.  He appears to be oriented x3 and can relate his history and answer questions appropriately.   HENT:      Head: Normocephalic.   Eyes:      Extraocular Movements: EOM normal.      Pupils: Pupils are equal, round, and reactive to light.   Neck:      Thyroid: No thyromegaly.      Trachea: No tracheal deviation.   Cardiovascular:      Rate and Rhythm: Normal rate and regular rhythm.      Heart sounds: Normal heart sounds.   Pulmonary:      Effort:  Pulmonary effort is normal.      Breath sounds: Normal breath sounds.   Abdominal:      General: Bowel sounds are normal. There is no distension.      Palpations: Abdomen is soft. There is no mass.      Tenderness: There is no abdominal tenderness. There is no right CVA tenderness, left CVA tenderness, guarding or rebound.      Hernia: No hernia is present.      Comments: The abdomen is soft without tenderness masses organomegaly.  Bowel sounds are normal.   Musculoskeletal:         General: Normal range of motion.      Cervical back: Normal range of motion and neck supple.      Comments: Ambulate normally.  He can go from the sitting the standing position without difficulty.  He can get on the exam table without difficulty or assistance.   Lymphadenopathy:      Cervical: No cervical adenopathy.   Skin:     General: Skin is warm and dry.   Neurological:      Mental Status: He is alert and oriented to person, place, and time.      Cranial Nerves: No cranial nerve deficit.   Psychiatric:         Mood and Affect: Mood and affect normal.         Behavior: Behavior normal.             Plan:       Rectal bleeding     Abdominal pain, unspecified abdominal location  -     CT Abdomen Pelvis W Wo Contrast; Future; Expected date: 01/12/2022  -     CBC Auto Differential; Future; Expected date: 01/12/2022  -     Comprehensive Metabolic Panel; Future; Expected date: 01/12/2022     Initially CT scan and labs will be obtained.  The prior clinic records were requested.  He will need to have a colonoscopy.  He will make a food diary avoid the offending foods and add more fiber to the diet.  He continues his current medications vitamins and minerals.  He is encouraged to enroll in the smoking cessation program.                   Other Notes     All notes        Instructions         Follow up in about 1 month (around 2/12/2022).    He will be scheduled for CT scan of the abdomen to evaluate the back pain and abdominal symptoms.  He  "will need to have a colonoscopy.  Labs will be obtained today.  He continues his current medications vitamins and minerals and adequate fiber to produced daily bowel movements.               After Visit Summary (Printed 1/12/2022)      Additional Documentation    Vitals:  /82 (BP Location: Right arm, Patient Position: Sitting, BP Method: Medium (Manual))     Pulse 64     Resp 14     Ht 6' 1" (1.854 m)     Wt 78.5 kg (173 lb)     SpO2 97%     BMI 22.82 kg/m²     BSA 2.01 m²     Pain Sc   4 (Loc: Back)     Flowsheets:  Code Vitals,     Anthropometrics        SmartForms:   [facility] AMB - FALL RISK        Encounter Info:  Billing Info,     History,     Allergies,     Detailed Report          Communications         AMB Visit Summary: Provider Version sent to KIEL Gruber      Not recorded      All Charges for This Encounter    Code Description Service Date Service Provider Modifiers Qty   76055 CO OFFICE/OUTPT VISIT, NEW, LEVL IV, 45-59 MIN 1/12/2022 Krishna Loomis MD S$PBB 1   12549991 HC OFFICE/OUTPT VISIT, NEW, LEVL IV, 45-59 MIN 1/12/2022 Krishna Loomis MD PBBFAC, PN 1   [id number] CO PBB SHADOW E&M-NEW PATIENT-LVL IV 1/12/2022 Krishna Loomis MD PBBFAC 1     Level of Service    Level of Service   CO OFFICE/OUTPT VISIT, NEW, LEVL IV, 45-59 MIN [82065]     BestPractice Advisories    Click to view BestPractice Advisory history     AVS Reports    Date/Time Report Action User   1/12/2022  1:37 PM After Visit Summary Printed Mariah Singh     Encounter-Level Documents on 01/12/2022:    After Visit Summary - Document on 1/12/2022 1:37 PM by Mariah Singh: After Visit Summary      Orders Placed         CBC Auto Differential         Comprehensive Metabolic Panel         CT Abdomen Pelvis W Wo Contrast       All Encounter Results     Medication Changes         None       Medication List     Visit Diagnoses         Rectal bleeding         Abdominal pain, unspecified abdominal location       Problem List     He is " here for colonoscopy.  He has had back pain probably related to the to the arthritis.  He has developed bowel irregularity with hematochezia.  He denies easy bleeding hematemesis or melena.  He has good health knowledge and he understands the procedures of colonoscopy.  Specifically he realizes there is an extremely small incidence of bleeding perforation or aspiration.  Additionally realizes that the quality of the colonoscopy depends upon the quality of the prep kit.  Also realizes there is an extremely small incidence of non detection of small sessile polyps particular the right colon.  He had some difficulty with the prep kit but he believes that he is prepared.  History and physical is unchanged.  Physical examination.  Well-nourished well-hydrated afebrile nonicteric white male.  He is normocephalic.  Pupils are normal.  Lungs reveal symmetrical respirations.  Heart reveals regular rhythm.  The abdomen is soft organomegaly or masses are not detected.  Bowel sounds are normal.  Neurologic reveals the oriented x3.

## 2022-02-22 NOTE — ANESTHESIA POSTPROCEDURE EVALUATION
Anesthesia Post Evaluation    Patient: Lucas Dorsey    Procedure(s) Performed: Procedure(s) (LRB):  COLONOSCOPY (N/A)    Final Anesthesia Type: general      Patient location during evaluation: PACU  Patient participation: Yes- Able to Participate  Level of consciousness: awake and alert  Post-procedure vital signs: reviewed and stable  Pain management: adequate  Airway patency: patent    PONV status at discharge: No PONV  Anesthetic complications: no      Cardiovascular status: blood pressure returned to baseline  Respiratory status: unassisted  Hydration status: euvolemic  Follow-up not needed.          Vitals Value Taken Time   /91 02/22/22 1347   Temp 36.5 °C (97.7 °F) 02/22/22 1315   Pulse 67 02/22/22 1347   Resp 16 02/22/22 1347   SpO2 100 % 02/22/22 1348   Vitals shown include unvalidated device data.      Event Time   Out of Recovery 13:45:00         Pain/Milton Score: Milton Score: 10 (2/22/2022  1:30 PM)  Modified Milton Score: 19 (2/22/2022  1:45 PM)

## 2022-02-22 NOTE — ANESTHESIA PREPROCEDURE EVALUATION
02/22/2022  Lucas Dorsey is a 56 y.o., male.      Pre-op Assessment    I have reviewed the Patient Summary Reports.     I have reviewed the Nursing Notes. I have reviewed the NPO Status.   I have reviewed the Medications.     Review of Systems  Social:  Smoker    Hematology/Oncology:  Hematology Normal   Oncology Normal     EENT/Dental:EENT/Dental Normal   Cardiovascular:   Hypertension    Pulmonary:   COPD    Renal/:  Renal/ Normal     Hepatic/GI:  Hepatic/GI Normal    Musculoskeletal:  Musculoskeletal Normal    Neurological:  Neurology Normal    Endocrine:  Endocrine Normal    Dermatological:  Skin Normal    Psych:  Psychiatric Normal           Physical Exam  General: Well nourished    Airway:  Mallampati: II   Mouth Opening: Normal  TM Distance: Normal  Tongue: Normal  Neck ROM: Normal ROM    Chest/Lungs:  Clear to auscultation    Heart:  Rate: Normal  Rhythm: Regular Rhythm        Anesthesia Plan  Type of Anesthesia, risks & benefits discussed:    Anesthesia Type: Gen Natural Airway  Intra-op Monitoring Plan: Standard ASA Monitors  Induction:  IV  Informed Consent: Informed consent signed with the Patient and all parties understand the risks and agree with anesthesia plan.  All questions answered.   ASA Score: 2  Day of Surgery Review of History & Physical: I have interviewed and examined the patient. I have reviewed the patient's H&P dated:     Ready For Surgery From Anesthesia Perspective.     .

## 2022-02-22 NOTE — DISCHARGE INSTRUCTIONS

## 2022-02-22 NOTE — DISCHARGE SUMMARY
[facility]  Discharge Note  Short Stay    Procedure(s) (LRB):  COLONOSCOPY (N/A)    OUTCOME: Patient tolerated treatment/procedure well without complication and is now ready for discharge.    DISPOSITION: Home or Self Care    FINAL DIAGNOSIS:  <principal problem not specified>    FOLLOWUP: In clinic    DISCHARGE INSTRUCTIONS:  No discharge procedures on file.     Colonoscopy revealed hemorrhoids.  He will add more fiber to the diet.  I explained that the prep was not ideal.  She had some retained fecal material reticulin right colon.  Bleeding was not seen.  Polyps were not seen.  He has chronic back pain.  He will follow up with his PCP.  He continues his other medications vitamins and minerals and follows up in the office.

## 2022-02-22 NOTE — BRIEF OP NOTE
Procedure.  Colonoscopy.  Indications hematochezia.  He has had occasional bowel irregularity and had an episode of hematochezia.  This was associated with chronic back pain.  The back pain appears to be in related to the GI symptoms and he states that the hematochezia has resolved.  He generally has daily bowel movements with occasional constipation.  He takes his medications.  He made a food diary and he avoids the offending and fatty foods.  He has had more fiber to the diet he is not straining.  The rectal bleeding has resolved.  His family history is negative for colon cancer.  He has good health knowledge and he understands the procedures of colonoscopy.  Specifically he realizes there is an extremely small incidence of bleeding perforation or aspiration.  Additionally realizes the quality of the colonoscopy depends upon the quality of prep.  He had difficulty with the prep kit but he believes he is prepared.  He also he realizes there is an extremely incidence of non detection of sessile of the right colon.  Anesthesia.  Monitored anesthesia coverage.  Procedure.  With the patient in the left lateral lateral supine position the procedure room.  There was perianal hyperemia and palpable hemorrhoids.  The Olympus colonoscope was and passed to the cecum.  The right colon had retained fecal material in vigorous washing was applied.  The cecum was identified by the ileocecal valve and triradiate fold.  There was very careful circumferential inspection mucosa the sh scope was withdrawn.  Bleeding ulcers or polyps were not seen.  The scope was retroflexed in the rectum and hemorrhoids were identified.  Patient tolerated the procedure well.  Impression 1. Hemorrhoids.

## 2022-02-23 ENCOUNTER — TELEPHONE (OUTPATIENT)
Dept: GASTROENTEROLOGY | Facility: CLINIC | Age: 57
End: 2022-02-23
Payer: OTHER GOVERNMENT

## 2022-02-23 NOTE — TELEPHONE ENCOUNTER
Patient stopped by office and dropped of letter concerning FMLA with work.  Call placed to [employer] with [employer] call went directly to voicemail and stated to leave call back information and someone would be in contact within 2 business days.  Call placed to patient to inform of above.  Patient verbalized understanding and has no further questions or concerns at this time.

## 2022-02-24 ENCOUNTER — TELEPHONE (OUTPATIENT)
Dept: GASTROENTEROLOGY | Facility: CLINIC | Age: 57
End: 2022-02-24
Payer: OTHER GOVERNMENT

## 2022-02-24 NOTE — TELEPHONE ENCOUNTER
----- Message from Carloz Batista sent at 2/24/2022  7:37 AM CST -----  Contact: human resources at [employer]  Type: Needs Medical Advice  Who Called:  reena/human resources at [employer]  Symptoms (please be specific):  n/a  How long has patient had these symptoms:  n/a  Pharmacy name and phone #:  n/a  Best Call Back Number: [phone], ext [phone]  Additional Information: Reena called in and stated she was returning call to office regarding patients FMLA.

## 2022-02-24 NOTE — TELEPHONE ENCOUNTER
Call placed to patient and answered questions regarding FMLA paperwork and confirmed receipt.  No further questions or concerns at this time.

## 2022-02-24 NOTE — TELEPHONE ENCOUNTER
----- Message from nAa Gamble sent at 2/24/2022  3:29 PM CST -----  Regarding: documentation  Contact: KIKA SANCHEZ [[id number]]  Caller: KIKA SANCHEZ [[id number]]  Phone: [phone]   Nature of call: caller requesting a call back in regards to [place] paperwork  Please call upon request.  Thank you!

## 2022-02-25 ENCOUNTER — TELEPHONE (OUTPATIENT)
Dept: GASTROENTEROLOGY | Facility: CLINIC | Age: 57
End: 2022-02-25
Payer: OTHER GOVERNMENT

## 2022-02-25 NOTE — TELEPHONE ENCOUNTER
----- Message from Kelsey Khan sent at 2/25/2022 10:04 AM CST -----  Regarding: advice  Contact: self  Type: Needs Medical Advice  Who Called:  self  Symptoms (please be specific):    How long has patient had these symptoms:    Pharmacy name and phone #:    Best Call Back Number: [phone]   Additional Information: Patient need a letter faxed to the patient's employer for FMLA for pt to be release to return to work Monday 02/28/2022.

## (undated) DEVICE — SOL WATER STRL IRR 1000ML

## (undated) DEVICE — CANISTER SUCTION 3000CC

## (undated) DEVICE — KIT ENDO CARRY-ON PROC 100310